# Patient Record
Sex: MALE | Employment: OTHER | URBAN - METROPOLITAN AREA
[De-identification: names, ages, dates, MRNs, and addresses within clinical notes are randomized per-mention and may not be internally consistent; named-entity substitution may affect disease eponyms.]

---

## 2021-07-13 PROCEDURE — 80197 ASSAY OF TACROLIMUS: CPT | Performed by: INTERNAL MEDICINE

## 2021-07-13 PROCEDURE — 82595 ASSAY OF CRYOGLOBULIN: CPT | Performed by: STUDENT IN AN ORGANIZED HEALTH CARE EDUCATION/TRAINING PROGRAM

## 2021-07-13 PROCEDURE — 86334 IMMUNOFIX E-PHORESIS SERUM: CPT | Mod: TC | Performed by: STUDENT IN AN ORGANIZED HEALTH CARE EDUCATION/TRAINING PROGRAM

## 2021-07-13 PROCEDURE — 80195 ASSAY OF SIROLIMUS: CPT | Performed by: INTERNAL MEDICINE

## 2021-07-14 ENCOUNTER — LAB REQUISITION (OUTPATIENT)
Dept: LAB | Facility: CLINIC | Age: 71
End: 2021-07-14

## 2021-07-14 LAB
SIROLIMUS BLD-MCNC: <2 UG/L (ref 5–15)
TACROLIMUS BLD-MCNC: 4.5 UG/L (ref 5–15)
TME LAST DOSE: ABNORMAL H

## 2021-07-21 LAB — CRYOGLOB SER QL: ABNORMAL

## 2021-07-22 LAB — CRYOGLOB IGA & IGG & IGM SER-IMP: NORMAL

## 2021-08-06 PROCEDURE — 80197 ASSAY OF TACROLIMUS: CPT

## 2021-08-06 PROCEDURE — 80195 ASSAY OF SIROLIMUS: CPT

## 2021-08-09 ENCOUNTER — LAB REQUISITION (OUTPATIENT)
Dept: LAB | Facility: CLINIC | Age: 71
End: 2021-08-09

## 2021-08-09 LAB
SIROLIMUS BLD-MCNC: <2 UG/L (ref 5–15)
TACROLIMUS BLD-MCNC: 4.3 UG/L (ref 5–15)
TME LAST DOSE: ABNORMAL H

## 2021-08-16 PROCEDURE — 80195 ASSAY OF SIROLIMUS: CPT

## 2021-08-16 PROCEDURE — 80197 ASSAY OF TACROLIMUS: CPT

## 2021-08-17 ENCOUNTER — LAB REQUISITION (OUTPATIENT)
Dept: LAB | Facility: CLINIC | Age: 71
End: 2021-08-17

## 2021-08-17 LAB
SIROLIMUS BLD-MCNC: 2.8 UG/L (ref 5–15)
TACROLIMUS BLD-MCNC: 4 UG/L (ref 5–15)
TME LAST DOSE: ABNORMAL H

## 2021-08-24 PROCEDURE — 80195 ASSAY OF SIROLIMUS: CPT

## 2021-08-24 PROCEDURE — 80197 ASSAY OF TACROLIMUS: CPT

## 2021-08-25 ENCOUNTER — LAB REQUISITION (OUTPATIENT)
Dept: LAB | Facility: CLINIC | Age: 71
End: 2021-08-25

## 2021-08-25 LAB
SIROLIMUS BLD-MCNC: 3.2 UG/L (ref 5–15)
TACROLIMUS BLD-MCNC: 4.2 UG/L (ref 5–15)
TME LAST DOSE: ABNORMAL H

## 2021-09-21 PROCEDURE — 80195 ASSAY OF SIROLIMUS: CPT

## 2021-09-21 PROCEDURE — 80197 ASSAY OF TACROLIMUS: CPT

## 2021-09-22 ENCOUNTER — LAB REQUISITION (OUTPATIENT)
Dept: LAB | Facility: CLINIC | Age: 71
End: 2021-09-22

## 2021-09-22 LAB
SIROLIMUS BLD-MCNC: 4 UG/L (ref 5–15)
TACROLIMUS BLD-MCNC: 3.5 UG/L (ref 5–15)
TME LAST DOSE: ABNORMAL H

## 2021-09-28 PROCEDURE — 80195 ASSAY OF SIROLIMUS: CPT

## 2021-09-28 PROCEDURE — 80197 ASSAY OF TACROLIMUS: CPT

## 2021-09-29 ENCOUNTER — LAB REQUISITION (OUTPATIENT)
Dept: LAB | Facility: CLINIC | Age: 71
End: 2021-09-29

## 2021-09-29 LAB
SIROLIMUS BLD-MCNC: 3.9 UG/L (ref 5–15)
TACROLIMUS BLD-MCNC: 3.1 UG/L (ref 5–15)
TME LAST DOSE: ABNORMAL H

## 2021-10-13 PROCEDURE — 80195 ASSAY OF SIROLIMUS: CPT

## 2021-10-13 PROCEDURE — 80197 ASSAY OF TACROLIMUS: CPT

## 2021-10-14 ENCOUNTER — LAB REQUISITION (OUTPATIENT)
Dept: LAB | Facility: CLINIC | Age: 71
End: 2021-10-14

## 2021-10-14 LAB
SIROLIMUS BLD-MCNC: 3.5 UG/L (ref 5–15)
TACROLIMUS BLD-MCNC: <3 UG/L (ref 5–15)
TME LAST DOSE: ABNORMAL H

## 2021-10-27 PROCEDURE — 80197 ASSAY OF TACROLIMUS: CPT

## 2021-10-27 PROCEDURE — 80195 ASSAY OF SIROLIMUS: CPT

## 2021-10-28 ENCOUNTER — LAB REQUISITION (OUTPATIENT)
Dept: LAB | Facility: CLINIC | Age: 71
End: 2021-10-28

## 2021-10-29 LAB
SIROLIMUS BLD-MCNC: 2.3 UG/L (ref 5–15)
TACROLIMUS BLD-MCNC: 3.7 UG/L (ref 5–15)
TME LAST DOSE: ABNORMAL H

## 2021-11-23 PROCEDURE — 80195 ASSAY OF SIROLIMUS: CPT

## 2021-11-23 PROCEDURE — 80197 ASSAY OF TACROLIMUS: CPT

## 2021-11-24 ENCOUNTER — LAB REQUISITION (OUTPATIENT)
Dept: LAB | Facility: CLINIC | Age: 71
End: 2021-11-24

## 2021-11-25 LAB
SIROLIMUS BLD-MCNC: 3.4 UG/L (ref 5–15)
TACROLIMUS BLD-MCNC: 4.5 UG/L (ref 5–15)
TME LAST DOSE: ABNORMAL H

## 2021-12-01 PROCEDURE — 80197 ASSAY OF TACROLIMUS: CPT

## 2021-12-02 ENCOUNTER — LAB REQUISITION (OUTPATIENT)
Dept: LAB | Facility: CLINIC | Age: 71
End: 2021-12-02

## 2021-12-02 LAB
TACROLIMUS BLD-MCNC: 3.8 UG/L (ref 5–15)
TME LAST DOSE: ABNORMAL H
TME LAST DOSE: ABNORMAL H

## 2021-12-27 PROCEDURE — 80197 ASSAY OF TACROLIMUS: CPT

## 2021-12-27 PROCEDURE — 80195 ASSAY OF SIROLIMUS: CPT

## 2021-12-28 ENCOUNTER — LAB REQUISITION (OUTPATIENT)
Dept: LAB | Facility: CLINIC | Age: 71
End: 2021-12-28

## 2021-12-28 LAB
SIROLIMUS BLD-MCNC: 3.3 UG/L (ref 5–15)
TACROLIMUS BLD-MCNC: 3.4 UG/L (ref 5–15)
TME LAST DOSE: ABNORMAL H

## 2022-01-01 ENCOUNTER — LAB REQUISITION (OUTPATIENT)
Dept: LAB | Facility: CLINIC | Age: 72
End: 2022-01-01

## 2022-01-01 LAB
CMV DNA SPEC NAA+PROBE-ACNC: NOT DETECTED IU/ML
TACROLIMUS BLD-MCNC: 11.3 UG/L (ref 5–15)
TACROLIMUS BLD-MCNC: 14.9 UG/L (ref 5–15)
TACROLIMUS BLD-MCNC: 16.6 UG/L (ref 5–15)
TACROLIMUS BLD-MCNC: 6.4 UG/L (ref 5–15)
TACROLIMUS BLD-MCNC: 7.7 UG/L (ref 5–15)
TME LAST DOSE: ABNORMAL H
TME LAST DOSE: ABNORMAL H
TME LAST DOSE: NORMAL H

## 2022-01-01 PROCEDURE — 80197 ASSAY OF TACROLIMUS: CPT

## 2022-01-01 PROCEDURE — 87252 VIRUS INOCULATION TISSUE: CPT

## 2022-04-26 PROCEDURE — 80195 ASSAY OF SIROLIMUS: CPT

## 2022-04-26 PROCEDURE — 80197 ASSAY OF TACROLIMUS: CPT

## 2022-04-27 ENCOUNTER — LAB REQUISITION (OUTPATIENT)
Dept: LAB | Facility: CLINIC | Age: 72
End: 2022-04-27

## 2022-04-27 LAB
SIROLIMUS BLD-MCNC: 5.9 UG/L (ref 5–15)
TACROLIMUS BLD-MCNC: 7.1 UG/L (ref 5–15)
TME LAST DOSE: NORMAL H

## 2022-05-09 PROCEDURE — 80195 ASSAY OF SIROLIMUS: CPT

## 2022-05-09 PROCEDURE — 80197 ASSAY OF TACROLIMUS: CPT

## 2022-05-10 ENCOUNTER — LAB REQUISITION (OUTPATIENT)
Dept: LAB | Facility: CLINIC | Age: 72
End: 2022-05-10

## 2022-05-10 LAB
SIROLIMUS BLD-MCNC: 2.8 UG/L (ref 5–15)
TACROLIMUS BLD-MCNC: 3.2 UG/L (ref 5–15)
TME LAST DOSE: ABNORMAL H

## 2022-05-27 ENCOUNTER — LAB REQUISITION (OUTPATIENT)
Dept: LAB | Facility: CLINIC | Age: 72
End: 2022-05-27

## 2022-05-27 LAB
SIROLIMUS BLD-MCNC: 7.1 UG/L (ref 5–15)
TACROLIMUS BLD-MCNC: 9.6 UG/L (ref 5–15)
TME LAST DOSE: NORMAL H

## 2022-05-27 PROCEDURE — 80197 ASSAY OF TACROLIMUS: CPT

## 2022-05-27 PROCEDURE — 80195 ASSAY OF SIROLIMUS: CPT

## 2022-10-02 ENCOUNTER — LAB REQUISITION (OUTPATIENT)
Dept: LAB | Facility: CLINIC | Age: 72
End: 2022-10-02

## 2022-10-02 PROCEDURE — 80197 ASSAY OF TACROLIMUS: CPT

## 2022-10-02 PROCEDURE — 80195 ASSAY OF SIROLIMUS: CPT

## 2022-10-03 ENCOUNTER — LAB REQUISITION (OUTPATIENT)
Dept: LAB | Facility: CLINIC | Age: 72
End: 2022-10-03

## 2022-10-03 LAB
CMV DNA SPEC NAA+PROBE-ACNC: NOT DETECTED IU/ML
SIROLIMUS BLD-MCNC: 19 UG/L (ref 5–15)
TACROLIMUS BLD-MCNC: 13.3 UG/L (ref 5–15)
TME LAST DOSE: ABNORMAL H
TME LAST DOSE: ABNORMAL H
TME LAST DOSE: NORMAL H
TME LAST DOSE: NORMAL H

## 2022-10-13 ENCOUNTER — LAB REQUISITION (OUTPATIENT)
Dept: LAB | Facility: CLINIC | Age: 72
End: 2022-10-13

## 2022-10-13 PROCEDURE — 80197 ASSAY OF TACROLIMUS: CPT

## 2022-10-14 LAB
TACROLIMUS BLD-MCNC: 26.6 UG/L (ref 5–15)
TME LAST DOSE: ABNORMAL H
TME LAST DOSE: ABNORMAL H

## 2022-11-01 ENCOUNTER — LAB REQUISITION (OUTPATIENT)
Dept: LAB | Facility: CLINIC | Age: 72
End: 2022-11-01

## 2022-11-01 PROCEDURE — 80197 ASSAY OF TACROLIMUS: CPT

## 2022-11-02 LAB
TACROLIMUS BLD-MCNC: 17.9 UG/L (ref 5–15)
TME LAST DOSE: ABNORMAL H
TME LAST DOSE: ABNORMAL H

## 2022-11-10 ENCOUNTER — LAB REQUISITION (OUTPATIENT)
Dept: LAB | Facility: CLINIC | Age: 72
End: 2022-11-10

## 2022-11-10 PROCEDURE — 80197 ASSAY OF TACROLIMUS: CPT

## 2022-11-14 LAB
TACROLIMUS BLD-MCNC: 13.8 UG/L (ref 5–15)
TME LAST DOSE: NORMAL H
TME LAST DOSE: NORMAL H

## 2022-12-09 ENCOUNTER — LAB REQUISITION (OUTPATIENT)
Dept: LAB | Facility: CLINIC | Age: 72
End: 2022-12-09

## 2022-12-09 LAB
TACROLIMUS BLD-MCNC: 21.5 UG/L (ref 5–15)
TME LAST DOSE: ABNORMAL H
TME LAST DOSE: ABNORMAL H

## 2022-12-09 PROCEDURE — 80197 ASSAY OF TACROLIMUS: CPT

## 2023-01-01 ENCOUNTER — LAB REQUISITION (OUTPATIENT)
Dept: LAB | Facility: CLINIC | Age: 73
End: 2023-01-01

## 2023-01-01 ENCOUNTER — HOSPITAL ENCOUNTER (OUTPATIENT)
Dept: CARDIOLOGY | Facility: CLINIC | Age: 73
Discharge: HOME | End: 2023-06-22
Attending: PHYSICAL MEDICINE & REHABILITATION
Payer: OTHER GOVERNMENT

## 2023-01-01 ENCOUNTER — HOSPITAL ENCOUNTER (OUTPATIENT)
Dept: RESPIRATORY THERAPY | Facility: CLINIC | Age: 73
Discharge: HOME | End: 2023-06-22
Attending: PHYSICAL MEDICINE & REHABILITATION
Payer: OTHER GOVERNMENT

## 2023-01-01 ENCOUNTER — LAB (OUTPATIENT)
Dept: LAB | Facility: CLINIC | Age: 73
End: 2023-01-01
Attending: PHYSICAL MEDICINE & REHABILITATION
Payer: OTHER GOVERNMENT

## 2023-01-01 ENCOUNTER — TRANSCRIBE ORDERS (OUTPATIENT)
Dept: CARDIOLOGY | Facility: CLINIC | Age: 73
End: 2023-01-01
Payer: OTHER GOVERNMENT

## 2023-01-01 ENCOUNTER — HOSPITAL ENCOUNTER (OUTPATIENT)
Dept: RADIOLOGY | Facility: CLINIC | Age: 73
Discharge: HOME | End: 2023-06-22
Attending: PHYSICAL MEDICINE & REHABILITATION | Admitting: PHYSICAL MEDICINE & REHABILITATION
Payer: OTHER GOVERNMENT

## 2023-01-01 ENCOUNTER — TRANSCRIBE ORDERS (OUTPATIENT)
Dept: RESPIRATORY THERAPY | Facility: CLINIC | Age: 73
End: 2023-01-01

## 2023-01-01 ENCOUNTER — TRANSCRIBE ORDERS (OUTPATIENT)
Dept: RESPIRATORY THERAPY | Facility: CLINIC | Age: 73
End: 2023-01-01
Payer: OTHER GOVERNMENT

## 2023-01-01 DIAGNOSIS — D86.9 SARCOIDOSIS: ICD-10-CM

## 2023-01-01 DIAGNOSIS — I25.9 ISCHEMIC HEART DISEASE: ICD-10-CM

## 2023-01-01 DIAGNOSIS — E11.9 DIABETES MELLITUS (H): ICD-10-CM

## 2023-01-01 DIAGNOSIS — I25.9 CHRONIC ISCHEMIC HEART DISEASE: ICD-10-CM

## 2023-01-01 DIAGNOSIS — I25.9 CHRONIC ISCHEMIC HEART DISEASE: Primary | ICD-10-CM

## 2023-01-01 DIAGNOSIS — D86.9 SARCOIDOSIS: Primary | ICD-10-CM

## 2023-01-01 DIAGNOSIS — I25.5 ISCHEMIC MYOCARDIAL DYSFUNCTION: Primary | ICD-10-CM

## 2023-01-01 LAB
ALBUMIN SERPL-MCNC: 2.7 G/DL (ref 3.5–5)
ALP SERPL-CCNC: 84 U/L (ref 45–120)
ALT SERPL W P-5'-P-CCNC: 10 U/L (ref 0–45)
ANION GAP SERPL CALCULATED.3IONS-SCNC: 12 MMOL/L (ref 5–18)
AST SERPL W P-5'-P-CCNC: 40 U/L (ref 0–40)
ATRIAL RATE - MUSE: 82 BPM
BILIRUB SERPL-MCNC: 0.6 MG/DL (ref 0–1)
BUN SERPL-MCNC: 22 MG/DL (ref 8–28)
CALCIUM SERPL-MCNC: 8.5 MG/DL (ref 8.5–10.5)
CHLORIDE BLD-SCNC: 92 MMOL/L (ref 98–107)
CMV DNA SPEC NAA+PROBE-ACNC: NOT DETECTED IU/ML
CO2 SERPL-SCNC: 31 MMOL/L (ref 22–31)
CREAT SERPL-MCNC: 4.05 MG/DL (ref 0.7–1.3)
DIASTOLIC BLOOD PRESSURE - MUSE: NORMAL MMHG
EXPTIME-PRE: 5.09 SEC
FEF2575-%PRED-POST: 204 %
FEF2575-%PRED-PRE: 209 %
FEF2575-POST: 4.39 L/SEC
FEF2575-PRE: 4.51 L/SEC
FEF2575-PRED: 2.15 L/SEC
FEFMAX-%PRED-PRE: 84 %
FEFMAX-PRE: 6.74 L/SEC
FEFMAX-PRED: 7.94 L/SEC
FEV1-%PRED-PRE: 54 %
FEV1-PRE: 1.57 L
FEV1FEV6-PRE: 96 %
FEV1FEV6-PRED: 77 %
FEV1FVC-PRE: 96 %
FEV1FVC-PRED: 76 %
FIFMAX-PRE: 5.53 L/SEC
FVC-%PRED-PRE: 43 %
FVC-PRE: 1.63 L
FVC-PRED: 3.78 L
GFR SERPL CREATININE-BSD FRML MDRD: 15 ML/MIN/1.73M2
GLUCOSE BLD-MCNC: 83 MG/DL (ref 70–125)
INTERPRETATION ECG - MUSE: NORMAL
LVEF ECHO: NORMAL
P AXIS - MUSE: 36 DEGREES
POTASSIUM BLD-SCNC: 3 MMOL/L (ref 3.5–5)
PR INTERVAL - MUSE: 130 MS
PROT SERPL-MCNC: 6.1 G/DL (ref 6–8)
QRS DURATION - MUSE: 78 MS
QT - MUSE: 372 MS
QTC - MUSE: 434 MS
R AXIS - MUSE: 51 DEGREES
SODIUM SERPL-SCNC: 135 MMOL/L (ref 136–145)
SYSTOLIC BLOOD PRESSURE - MUSE: NORMAL MMHG
T AXIS - MUSE: 43 DEGREES
TACROLIMUS BLD-MCNC: 10 UG/L (ref 5–15)
TACROLIMUS BLD-MCNC: 11 UG/L (ref 5–15)
TACROLIMUS BLD-MCNC: 12.9 UG/L (ref 5–15)
TACROLIMUS BLD-MCNC: 15.2 UG/L (ref 5–15)
TACROLIMUS BLD-MCNC: 3 UG/L (ref 5–15)
TACROLIMUS BLD-MCNC: 5.2 UG/L (ref 5–15)
TACROLIMUS BLD-MCNC: 5.3 UG/L (ref 5–15)
TACROLIMUS BLD-MCNC: 5.7 UG/L (ref 5–15)
TACROLIMUS BLD-MCNC: 6.2 UG/L (ref 5–15)
TACROLIMUS BLD-MCNC: 6.4 UG/L (ref 5–15)
TACROLIMUS BLD-MCNC: 6.6 UG/L (ref 5–15)
TACROLIMUS BLD-MCNC: 6.7 UG/L (ref 5–15)
TACROLIMUS BLD-MCNC: 7.1 UG/L (ref 5–15)
TACROLIMUS BLD-MCNC: 7.4 UG/L (ref 5–15)
TACROLIMUS BLD-MCNC: 7.4 UG/L (ref 5–15)
TACROLIMUS BLD-MCNC: 7.6 UG/L (ref 5–15)
TACROLIMUS BLD-MCNC: 9 UG/L (ref 5–15)
TME LAST DOSE: ABNORMAL H
TME LAST DOSE: NORMAL H
VENTRICULAR RATE- MUSE: 82 BPM

## 2023-01-01 PROCEDURE — 250N000009 HC RX 250

## 2023-01-01 PROCEDURE — 80197 ASSAY OF TACROLIMUS: CPT

## 2023-01-01 PROCEDURE — 255N000002 HC RX 255 OP 636: Performed by: INTERNAL MEDICINE

## 2023-01-01 PROCEDURE — 80053 COMPREHEN METABOLIC PANEL: CPT

## 2023-01-01 PROCEDURE — 93005 ELECTROCARDIOGRAM TRACING: CPT

## 2023-01-01 PROCEDURE — 999N000157 HC STATISTIC RCP TIME EA 10 MIN

## 2023-01-01 PROCEDURE — 93306 TTE W/DOPPLER COMPLETE: CPT | Mod: 26 | Performed by: INTERNAL MEDICINE

## 2023-01-01 PROCEDURE — 71046 X-RAY EXAM CHEST 2 VIEWS: CPT

## 2023-01-01 PROCEDURE — 94060 EVALUATION OF WHEEZING: CPT | Mod: 26 | Performed by: INTERNAL MEDICINE

## 2023-01-01 PROCEDURE — 93010 ELECTROCARDIOGRAM REPORT: CPT | Performed by: INTERNAL MEDICINE

## 2023-01-01 PROCEDURE — 94060 EVALUATION OF WHEEZING: CPT

## 2023-01-01 PROCEDURE — 36415 COLL VENOUS BLD VENIPUNCTURE: CPT

## 2023-01-01 RX ORDER — ALBUTEROL SULFATE 0.83 MG/ML
2.5 SOLUTION RESPIRATORY (INHALATION) ONCE
Status: COMPLETED | OUTPATIENT
Start: 2023-01-01 | End: 2023-01-01

## 2023-01-01 RX ADMIN — PERFLUTREN 2 ML: 6.52 INJECTION, SUSPENSION INTRAVENOUS at 08:53

## 2023-01-01 RX ADMIN — ALBUTEROL SULFATE 2.5 MG: 2.5 SOLUTION RESPIRATORY (INHALATION) at 09:55

## 2023-06-22 NOTE — PROGRESS NOTES
RESPIRATORY CARE NOTE    Pre and Post Spirometry Pulmonary Function Test completed by patient.  Good patient effort and cooperation. Albuterol 2.5 mg neb given for bronchodilation.  The results of this test meet the ATS standards for acceptability and repeatability. PT returned to baseline and left in no distress.    Charlene Berman, RT  6/22/2023